# Patient Record
Sex: FEMALE | Race: BLACK OR AFRICAN AMERICAN | NOT HISPANIC OR LATINO | Employment: STUDENT | URBAN - METROPOLITAN AREA
[De-identification: names, ages, dates, MRNs, and addresses within clinical notes are randomized per-mention and may not be internally consistent; named-entity substitution may affect disease eponyms.]

---

## 2020-09-22 ENCOUNTER — LAB VISIT (OUTPATIENT)
Dept: PRIMARY CARE CLINIC | Facility: CLINIC | Age: 20
End: 2020-09-22
Payer: COMMERCIAL

## 2020-09-22 DIAGNOSIS — Z03.818 ENCOUNTER FOR OBSERVATION FOR SUSPECTED EXPOSURE TO OTHER BIOLOGICAL AGENTS RULED OUT: ICD-10-CM

## 2020-09-22 PROCEDURE — U0003 INFECTIOUS AGENT DETECTION BY NUCLEIC ACID (DNA OR RNA); SEVERE ACUTE RESPIRATORY SYNDROME CORONAVIRUS 2 (SARS-COV-2) (CORONAVIRUS DISEASE [COVID-19]), AMPLIFIED PROBE TECHNIQUE, MAKING USE OF HIGH THROUGHPUT TECHNOLOGIES AS DESCRIBED BY CMS-2020-01-R: HCPCS

## 2020-09-24 LAB — SARS-COV-2 RNA RESP QL NAA+PROBE: NOT DETECTED

## 2020-10-02 NOTE — PROGRESS NOTES
CC: Irregular Menstruation     20 y.o. Female presents today for initial evaluation of her irregular menstruation. Patient states she has never utilized birth control. Patient reports stress levels are moderate. Patient states she feels like she is not eating enough in general (unrelated to energy expenditure). Patient states that when she does experience menstruation, periods are normal (not light or heavy) and last for 3-4 days every 35-45 days for many years but cycles have been irregular the last 10 months. Patient feels that her weight if fluctuating as well as her appetite. Patient is not sexually active. Patient has never had a fracture.     Menarche: 12yrs old  LMP:  9/15/2020  Attempted treatments: None      REVIEW OF SYSTEMS:   Constitution: Patient denies fever, chills or fatigue.  Eyes: Patient denies eye pain or vision changes.  CVS: Patient denies chest pain.  Lungs: Patient denies shortness of breath or cough.  Abdomen: Patient denies any stomach pain, nausea, vomiting, or diarrhea  Skin: Patient denies skin rash or itching.    Musculoskeletal: Patient denies recent injuries or trauma.  Neuro: Patient denies any numbness or tingling in upper or lower extremities.  Psych: Patient denies any current anxiety or nervousness.    PAST MEDICAL HISTORY:   History reviewed. No pertinent past medical history.    PAST SURGICAL HISTORY:  History reviewed. No pertinent surgical history.    FAMILY HISTORY:  History reviewed. No pertinent family history.    SOCIAL HISTORY:  Social History     Socioeconomic History    Marital status: Single     Spouse name: Not on file    Number of children: Not on file    Years of education: Not on file    Highest education level: Not on file   Occupational History    Not on file   Social Needs    Financial resource strain: Not on file    Food insecurity     Worry: Not on file     Inability: Not on file    Transportation needs     Medical: Not on file     Non-medical: Not on  "file   Tobacco Use    Smoking status: Not on file   Substance and Sexual Activity    Alcohol use: Not on file    Drug use: Not on file    Sexual activity: Not on file   Lifestyle    Physical activity     Days per week: Not on file     Minutes per session: Not on file    Stress: Not on file   Relationships    Social connections     Talks on phone: Not on file     Gets together: Not on file     Attends Buddhist service: Not on file     Active member of club or organization: Not on file     Attends meetings of clubs or organizations: Not on file     Relationship status: Not on file   Other Topics Concern    Not on file   Social History Narrative    Not on file       MEDICATIONS:   No current outpatient medications on file.    ALLERGIES:   Review of patient's allergies indicates:  No Known Allergies     PHYSICAL EXAMINATION:  /72   Ht 5' 10" (1.778 m)   Wt 62 kg (136 lb 11 oz)   BMI 19.61 kg/m²   Vitals signs and nursing note have been reviewed.    General: In no acute distress, well developed, well nourished, no diaphoresis  Eyes: EOM full and smooth, no eye redness or discharge  HENT: normocephalic and atraumatic, neck supple, trachea midline, no nasal discharge, oral pharynx pink and moist  Cardiovascular: no LE edema, pulse strong and regular, unable to assess capillary refill as patient had an acrylic nails.  Lungs: respirations non-labored, no conversational dyspnea   Abdomen:  Soft, non-distended, no hepatosplenomegaly  Neuro: AAOx3, CN2-12 grossly intact  Skin: No rashes, warm and dry  Psychiatric: cooperative, pleasant, mood and affect appropriate for age      ASSESSMENT:      ICD-10-CM ICD-9-CM   1. Irregular menstrual cycle  N92.6 626.4   2. Amenorrhea  N91.2 626.0         PLAN:  Patient presents today with her regular menstrual cycles for the last 2 months.  She states that her menstrual cycles have been coming every 2-3 months.  She is not sexually active this pregnancy is not a concern " at this moment.  Because we were unable to accurately weight the patient today, we have advised patient to do weekly weight ins with the  at her institution.  Based on the way she provided today, which is an estimate, her BMI is in the low to normal range, but given that her weight has fluctuated there is still concern for low BMI/energy deficiency.  We will put in a referral for a nutritionist as our working diagnosis at this moment is energy deficiency.  We will order thyroid panels to rule out thyroid as a cause of her irregular menstrual cycles.  Will follow-up with patient in 6 weeks.      Time was spent counseled patient on eating healthy and regular diet, weight energy deficiency is, as well as while we are ordering thyroid function test.  Patient was also counseled on the importance of her weekly weigh-in for the next 6 weeks.    Future planning includes - TSH, free T4, nutritionist referral, the weight checks with  as a very heavy.    All questions were answered to the best of my ability and all concerns were addressed at this time.    Follow up in 6 weeks for above, or sooner if needed.      This note is dictated using the M*Modal Fluency Direct word recognition program. There are word recognition mistakes that are occasionally missed on review.

## 2020-10-05 ENCOUNTER — OFFICE VISIT (OUTPATIENT)
Dept: ORTHOPEDICS | Facility: CLINIC | Age: 20
End: 2020-10-05
Payer: COMMERCIAL

## 2020-10-05 VITALS
SYSTOLIC BLOOD PRESSURE: 110 MMHG | DIASTOLIC BLOOD PRESSURE: 72 MMHG | BODY MASS INDEX: 19.57 KG/M2 | WEIGHT: 136.69 LBS | HEIGHT: 70 IN

## 2020-10-05 DIAGNOSIS — N92.6 IRREGULAR MENSTRUAL CYCLE: Primary | ICD-10-CM

## 2020-10-05 DIAGNOSIS — N91.2 AMENORRHEA: ICD-10-CM

## 2020-10-05 PROCEDURE — 99203 PR OFFICE/OUTPT VISIT, NEW, LEVL III, 30-44 MIN: ICD-10-PCS | Mod: S$GLB,,, | Performed by: STUDENT IN AN ORGANIZED HEALTH CARE EDUCATION/TRAINING PROGRAM

## 2020-10-05 PROCEDURE — 3008F PR BODY MASS INDEX (BMI) DOCUMENTED: ICD-10-PCS | Mod: CPTII,S$GLB,, | Performed by: STUDENT IN AN ORGANIZED HEALTH CARE EDUCATION/TRAINING PROGRAM

## 2020-10-05 PROCEDURE — 99999 PR PBB SHADOW E&M-EST. PATIENT-LVL III: CPT | Mod: PBBFAC,,, | Performed by: STUDENT IN AN ORGANIZED HEALTH CARE EDUCATION/TRAINING PROGRAM

## 2020-10-05 PROCEDURE — 99999 PR PBB SHADOW E&M-EST. PATIENT-LVL III: ICD-10-PCS | Mod: PBBFAC,,, | Performed by: STUDENT IN AN ORGANIZED HEALTH CARE EDUCATION/TRAINING PROGRAM

## 2020-10-05 PROCEDURE — 99203 OFFICE O/P NEW LOW 30 MIN: CPT | Mod: S$GLB,,, | Performed by: STUDENT IN AN ORGANIZED HEALTH CARE EDUCATION/TRAINING PROGRAM

## 2020-10-05 PROCEDURE — 3008F BODY MASS INDEX DOCD: CPT | Mod: CPTII,S$GLB,, | Performed by: STUDENT IN AN ORGANIZED HEALTH CARE EDUCATION/TRAINING PROGRAM

## 2020-10-14 ENCOUNTER — LAB VISIT (OUTPATIENT)
Dept: LAB | Facility: HOSPITAL | Age: 20
End: 2020-10-14
Attending: STUDENT IN AN ORGANIZED HEALTH CARE EDUCATION/TRAINING PROGRAM
Payer: COMMERCIAL

## 2020-10-14 DIAGNOSIS — N91.2 AMENORRHEA: ICD-10-CM

## 2020-10-14 PROCEDURE — 36415 COLL VENOUS BLD VENIPUNCTURE: CPT | Mod: PO

## 2020-10-14 PROCEDURE — 84439 ASSAY OF FREE THYROXINE: CPT

## 2020-10-14 PROCEDURE — 84443 ASSAY THYROID STIM HORMONE: CPT

## 2020-10-15 LAB
T4 FREE SERPL-MCNC: 0.98 NG/DL (ref 0.71–1.51)
TSH SERPL DL<=0.005 MIU/L-ACNC: 0.36 UIU/ML (ref 0.4–4)

## 2020-11-11 NOTE — PROGRESS NOTES
CC: irregular periods     20 y.o. Female presents today for follow up evaluation of her irregular periods.  Patient reports she experienced menstruation in September and October with average cycle time between. Patient has yet to see nutritionist, but did report to her labwork appt. Patient reports stress levels are high right now due to both school and athletics. Patient states that overall, she feels more normal since last visit with regards to her periods.     REVIEW OF SYSTEMS:   Constitution: Patient denies fever or chills.  Eyes: Patient denies eye pain or vision changes.  CVS: Patient denies chest pain.  Lungs: Patient denies shortness of breath or cough.  Abdomen: Patient denies any stomach pain, nausea, vomiting, or diarrhea  Skin: Patient denies skin rash or itching.    Musculoskeletal: Patient denies recent injuries or trauma.  Neuro: Patient denies any numbness or tingling in upper or lower extremities.  Psych: Patient denies any current anxiety or nervousness.    PAST MEDICAL HISTORY:   History reviewed. No pertinent past medical history.    PAST SURGICAL HISTORY:  History reviewed. No pertinent surgical history.    FAMILY HISTORY:  History reviewed. No pertinent family history.    SOCIAL HISTORY:  Social History     Socioeconomic History    Marital status: Single     Spouse name: Not on file    Number of children: Not on file    Years of education: Not on file    Highest education level: Not on file   Occupational History    Not on file   Social Needs    Financial resource strain: Not on file    Food insecurity     Worry: Not on file     Inability: Not on file    Transportation needs     Medical: Not on file     Non-medical: Not on file   Tobacco Use    Smoking status: Not on file   Substance and Sexual Activity    Alcohol use: Not on file    Drug use: Not on file    Sexual activity: Not on file   Lifestyle    Physical activity     Days per week: Not on file     Minutes per session: Not on  "file    Stress: Not on file   Relationships    Social connections     Talks on phone: Not on file     Gets together: Not on file     Attends Advent service: Not on file     Active member of club or organization: Not on file     Attends meetings of clubs or organizations: Not on file     Relationship status: Not on file   Other Topics Concern    Not on file   Social History Narrative    Not on file       MEDICATIONS:   No current outpatient medications on file.    ALLERGIES:   Review of patient's allergies indicates:  No Known Allergies     PHYSICAL EXAMINATION:  BP 90/62   Ht 5' 10" (1.778 m)   Wt 62.2 kg (137 lb 2 oz)   BMI 19.68 kg/m²   Vitals signs and nursing note have been reviewed.  General: In no acute distress, well developed, well nourished, no diaphoresis  Eyes: EOM full and smooth, no eye redness or discharge  HENT: normocephalic and atraumatic, neck supple, trachea midline, no nasal discharge  Cardiovascular: no LE edema  Lungs: respirations non-labored, no conversational dyspnea   Neuro: AAOx3, CN2-12 grossly intact  Skin: No rashes, warm and dry  Psychiatric: cooperative, pleasant, mood and affect appropriate for age      ASSESSMENT:      ICD-10-CM ICD-9-CM   1. Irregular menstrual cycle  N92.6 626.4         PLAN:  Was unable to follow up with the nutritionist since her last visit due to scheduling areas.  Patient states that she was doing daily way ends in about 2 weeks ago had actually lost 5 lb but has since gained back.  Patient was instructed to keep a food diary over to expand while she is at home and [place] for the break.  We will schedule for patient to follow-up with nutrition when her schedule permits when she returns in January.  I will follow up with her after the nutritionist visit.  Working diagnosis is still energy deficiency which I think can be remedied to her diet.  Patient has had normal interval menstrual cycles for 2 months now, and I anticipate she will get her cycle this " month.    T4 within normal limits, TSH slightly below normal limits, not too concerned at this time for thyroid abnormality causing menstrual irregularities.    Future planning includes - follow-up with pediatrician, weekly weight checks, 2 weeks flu diary.    All questions were answered to the best of my ability and all concerns were addressed at this time.    Follow up for above, or sooner if needed.      This note is dictated using the M*Modal Fluency Direct word recognition program. There are word recognition mistakes that are occasionally missed on review.

## 2020-11-16 ENCOUNTER — OFFICE VISIT (OUTPATIENT)
Dept: ORTHOPEDICS | Facility: CLINIC | Age: 20
End: 2020-11-16
Payer: COMMERCIAL

## 2020-11-16 VITALS
SYSTOLIC BLOOD PRESSURE: 90 MMHG | WEIGHT: 137.13 LBS | BODY MASS INDEX: 19.63 KG/M2 | DIASTOLIC BLOOD PRESSURE: 62 MMHG | HEIGHT: 70 IN

## 2020-11-16 DIAGNOSIS — N92.6 IRREGULAR MENSTRUAL CYCLE: Primary | ICD-10-CM

## 2020-11-16 PROCEDURE — 3008F BODY MASS INDEX DOCD: CPT | Mod: CPTII,S$GLB,, | Performed by: STUDENT IN AN ORGANIZED HEALTH CARE EDUCATION/TRAINING PROGRAM

## 2020-11-16 PROCEDURE — 99213 OFFICE O/P EST LOW 20 MIN: CPT | Mod: S$GLB,,, | Performed by: STUDENT IN AN ORGANIZED HEALTH CARE EDUCATION/TRAINING PROGRAM

## 2020-11-16 PROCEDURE — 3008F PR BODY MASS INDEX (BMI) DOCUMENTED: ICD-10-PCS | Mod: CPTII,S$GLB,, | Performed by: STUDENT IN AN ORGANIZED HEALTH CARE EDUCATION/TRAINING PROGRAM

## 2020-11-16 PROCEDURE — 99999 PR PBB SHADOW E&M-EST. PATIENT-LVL II: CPT | Mod: PBBFAC,,, | Performed by: STUDENT IN AN ORGANIZED HEALTH CARE EDUCATION/TRAINING PROGRAM

## 2020-11-16 PROCEDURE — 99213 PR OFFICE/OUTPT VISIT, EST, LEVL III, 20-29 MIN: ICD-10-PCS | Mod: S$GLB,,, | Performed by: STUDENT IN AN ORGANIZED HEALTH CARE EDUCATION/TRAINING PROGRAM

## 2020-11-16 PROCEDURE — 99999 PR PBB SHADOW E&M-EST. PATIENT-LVL II: ICD-10-PCS | Mod: PBBFAC,,, | Performed by: STUDENT IN AN ORGANIZED HEALTH CARE EDUCATION/TRAINING PROGRAM

## 2021-08-24 ENCOUNTER — ATHLETIC TRAINING SESSION (OUTPATIENT)
Dept: SPORTS MEDICINE | Facility: CLINIC | Age: 21
End: 2021-08-24

## 2021-08-24 NOTE — PROGRESS NOTES
Body Part Hip/Low Back   Injury Date 8/23/21       History: Pt reports intense (8/10) low back pain insidious onset. Pt states the pain is intermittent and last about 30 seconds when it happens. Pain starts with specific movements (walk/run/bend over) and limits her motion. Pain started yesterday and became more intense over time. No numbness or tingling. Pain is localized to specific spot (SI junction right side). Pt was able to finish practice without any limitation.     Physical Evaluation: No swelling or discoloration noted. Pt's gait is normal but becomes antalgic when pain begins. Pt is able to deep squat normally w/o pain. Lumbar motion WNL, hip motion BL WNL. MMT WNL BL for hip and spine. No pain to palpation anywhere on pelvis, sacrum, lumbar spine, or femur. Jt mobility normal at lumbar spine and hip.      Treatment: Attempted maneuvers to adjust SI joint (distraction, compression, active adduction/abduction, alternate active hip flexion/extension both directions) pt felt discomfort during all maneuvers and reported feeling minimally better when she stood up. Pt shown stretches to do for piriformis tightness (figure 4 standing and on table)     Plan: Pt will follow-up before practice tomorrow. If pain persists, may attempt SI maneuvers a second time or refer to OMT.     Suspected Injury: Sacro-iliac joint dysfunction R side.      Update 8/25/21: Saw pt at practice, asked how hip/back was feeling, pt reported feeling 100% better with no pain at all. Pt will follow-up if pain returns.

## 2021-09-29 ENCOUNTER — ATHLETIC TRAINING SESSION (OUTPATIENT)
Dept: SPORTS MEDICINE | Facility: CLINIC | Age: 21
End: 2021-09-29

## 2021-09-30 ENCOUNTER — ATHLETIC TRAINING SESSION (OUTPATIENT)
Dept: SPORTS MEDICINE | Facility: CLINIC | Age: 21
End: 2021-09-30

## 2021-10-01 ENCOUNTER — ATHLETIC TRAINING SESSION (OUTPATIENT)
Dept: SPORTS MEDICINE | Facility: CLINIC | Age: 21
End: 2021-10-01

## 2021-10-06 ENCOUNTER — ATHLETIC TRAINING SESSION (OUTPATIENT)
Dept: SPORTS MEDICINE | Facility: CLINIC | Age: 21
End: 2021-10-06

## 2021-10-09 ENCOUNTER — ATHLETIC TRAINING SESSION (OUTPATIENT)
Dept: SPORTS MEDICINE | Facility: CLINIC | Age: 21
End: 2021-10-09

## 2021-10-12 ENCOUNTER — ATHLETIC TRAINING SESSION (OUTPATIENT)
Dept: SPORTS MEDICINE | Facility: CLINIC | Age: 21
End: 2021-10-12

## 2022-01-21 NOTE — PROGRESS NOTES
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 Pt reports she had headache and cough. Pt reports no prev hx of cardiac / pulmonary conditions. Pt reports no family history of cardiac / pulmonary conditions or sudden cardiac arrest under 49 y/o. Pt reports they have not participated in physical activity since testing positive for COVID-19, and reports no abnormal symptoms with ADLs.  Date of positive test: 12/08/2021  Time in isolation: 7 days  Symptoms during viral course: headache, cough  Fever and/or chills: No  Body aches and/or Fatigue:  No  Chest pain: No  Hospitalization required?: No  H/o COVID: No    Vaccinated: Yes  Booster:No     REVIEW OF SYSTEMS:   Constitution: Patient denies fever or chills.  Eyes: Patient denies eye pain or vision changes.  CVS: Patient denies chest pain.  Lungs: Patient denies shortness of breath or cough.  Abdomen: Patient denies any stomach pain, nausea, vomiting, or diarrhea  Skin: Patient denies skin rash or itching.    Musculoskeletal: Patient denies recent injuries or trauma.  Neuro: Patient denies any numbness or tingling in upper or lower extremities.  Psych: Patient denies any current anxiety or nervousness.     PAST MEDICAL HISTORY:   No past medical history on file.    PAST SURGICAL HISTORY:  No past surgical history on file.     FAMILY HISTORY:  No family history on file.    SOCIAL HISTORY:  Social Connections: Not on file       MEDICATIONS:   No current outpatient medications on file.    ALLERGIES:   Review of patient's allergies indicates:  No Known Allergies     PHYSICAL EXAMINATION:  Vitals:    01/24/22 1538   BP: 110/64     Vitals signs and nursing note have been reviewed.  General: In no acute distress, well developed, well nourished, no diaphoresis  Eyes: EOM full and smooth, no eye redness or discharge  HEENT:  normocephalic and atraumatic, neck supple, trachea midline, no nasal discharge  Cardiovascular:  Regular rate and rhythm, S1 and S2 audible, no S3, no S4, no murmurs, no thrills, no JVD, no LE edema, bilateral and symmetric 2+ DP and radial pulses bilaterally  Lungs: respirations non-labored, no conversational dyspnea, CTABL, no wheezing, no rhonchi  Neuro: AAOx3, CN2-12 grossly intact  Skin: No rashes, warm and dry  Psychiatric: cooperative, pleasant, mood and affect appropriate for age     CARDIAC TESTING: none warranted at this time       ASSESSMENT:    1. History of 2019 novel coronavirus disease (COVID-19)    2. Encounter for observation for suspected exposure to other biological agents ruled out    3. Personal history of unspecified infectious and parasitic disease           PLAN:  1-3. History of COVID-19 -      -  Patient previously tested positive for COVID-19 on 12/8/21 and is here to obtain cardiac clearance prior to engaging in athletic activity due to possibility of myocarditis.  During the course of her COVID-19 infection, patient experienced mild symptoms. Patient denies known family history of cardiac conditions or early death.      - At this time the patient is completely asymptomatic and there are no exam findings concerning for myocarditis. In my opinion it is safe for the patient to start progressing weight lifting and cardiovascular training slowly and under the supervision of the athletic training staff.     - [facility] has been informed and is on board with the plan.     - Future planning includes -  If symptoms exacerbate during return to activity, further cardiac testing will be warranted.     All questions were answered to the best of my ability and all concerns were addressed at this time.     Follow up as needed for above. I will remain in close contact with the  to insure no symptoms occur when returning to exercise.        This note is dictated using the M*Modal Fluency Direct  word recognition program. There are word recognition mistakes that are occasionally missed on review.

## 2022-01-24 ENCOUNTER — OFFICE VISIT (OUTPATIENT)
Dept: SPORTS MEDICINE | Facility: CLINIC | Age: 22
End: 2022-01-24
Payer: COMMERCIAL

## 2022-01-24 VITALS
BODY MASS INDEX: 19.63 KG/M2 | SYSTOLIC BLOOD PRESSURE: 110 MMHG | HEIGHT: 70 IN | DIASTOLIC BLOOD PRESSURE: 64 MMHG | WEIGHT: 137.13 LBS

## 2022-01-24 DIAGNOSIS — Z86.19 PERSONAL HISTORY OF UNSPECIFIED INFECTIOUS AND PARASITIC DISEASE: ICD-10-CM

## 2022-01-24 DIAGNOSIS — Z86.16 HISTORY OF 2019 NOVEL CORONAVIRUS DISEASE (COVID-19): Primary | ICD-10-CM

## 2022-01-24 DIAGNOSIS — Z03.818 ENCOUNTER FOR OBSERVATION FOR SUSPECTED EXPOSURE TO OTHER BIOLOGICAL AGENTS RULED OUT: ICD-10-CM

## 2022-01-24 PROCEDURE — 99999 PR PBB SHADOW E&M-EST. PATIENT-LVL II: CPT | Mod: PBBFAC,,, | Performed by: STUDENT IN AN ORGANIZED HEALTH CARE EDUCATION/TRAINING PROGRAM

## 2022-01-24 PROCEDURE — 3078F PR MOST RECENT DIASTOLIC BLOOD PRESSURE < 80 MM HG: ICD-10-PCS | Mod: CPTII,S$GLB,, | Performed by: STUDENT IN AN ORGANIZED HEALTH CARE EDUCATION/TRAINING PROGRAM

## 2022-01-24 PROCEDURE — 3074F PR MOST RECENT SYSTOLIC BLOOD PRESSURE < 130 MM HG: ICD-10-PCS | Mod: CPTII,S$GLB,, | Performed by: STUDENT IN AN ORGANIZED HEALTH CARE EDUCATION/TRAINING PROGRAM

## 2022-01-24 PROCEDURE — 1160F PR REVIEW ALL MEDS BY PRESCRIBER/CLIN PHARMACIST DOCUMENTED: ICD-10-PCS | Mod: CPTII,S$GLB,, | Performed by: STUDENT IN AN ORGANIZED HEALTH CARE EDUCATION/TRAINING PROGRAM

## 2022-01-24 PROCEDURE — 3008F PR BODY MASS INDEX (BMI) DOCUMENTED: ICD-10-PCS | Mod: CPTII,S$GLB,, | Performed by: STUDENT IN AN ORGANIZED HEALTH CARE EDUCATION/TRAINING PROGRAM

## 2022-01-24 PROCEDURE — 99214 OFFICE O/P EST MOD 30 MIN: CPT | Mod: S$GLB,,, | Performed by: STUDENT IN AN ORGANIZED HEALTH CARE EDUCATION/TRAINING PROGRAM

## 2022-01-24 PROCEDURE — 3008F BODY MASS INDEX DOCD: CPT | Mod: CPTII,S$GLB,, | Performed by: STUDENT IN AN ORGANIZED HEALTH CARE EDUCATION/TRAINING PROGRAM

## 2022-01-24 PROCEDURE — 1160F RVW MEDS BY RX/DR IN RCRD: CPT | Mod: CPTII,S$GLB,, | Performed by: STUDENT IN AN ORGANIZED HEALTH CARE EDUCATION/TRAINING PROGRAM

## 2022-01-24 PROCEDURE — 1159F PR MEDICATION LIST DOCUMENTED IN MEDICAL RECORD: ICD-10-PCS | Mod: CPTII,S$GLB,, | Performed by: STUDENT IN AN ORGANIZED HEALTH CARE EDUCATION/TRAINING PROGRAM

## 2022-01-24 PROCEDURE — 1159F MED LIST DOCD IN RCRD: CPT | Mod: CPTII,S$GLB,, | Performed by: STUDENT IN AN ORGANIZED HEALTH CARE EDUCATION/TRAINING PROGRAM

## 2022-01-24 PROCEDURE — 3074F SYST BP LT 130 MM HG: CPT | Mod: CPTII,S$GLB,, | Performed by: STUDENT IN AN ORGANIZED HEALTH CARE EDUCATION/TRAINING PROGRAM

## 2022-01-24 PROCEDURE — 99999 PR PBB SHADOW E&M-EST. PATIENT-LVL II: ICD-10-PCS | Mod: PBBFAC,,, | Performed by: STUDENT IN AN ORGANIZED HEALTH CARE EDUCATION/TRAINING PROGRAM

## 2022-01-24 PROCEDURE — 99214 PR OFFICE/OUTPT VISIT, EST, LEVL IV, 30-39 MIN: ICD-10-PCS | Mod: S$GLB,,, | Performed by: STUDENT IN AN ORGANIZED HEALTH CARE EDUCATION/TRAINING PROGRAM

## 2022-01-24 PROCEDURE — 3078F DIAST BP <80 MM HG: CPT | Mod: CPTII,S$GLB,, | Performed by: STUDENT IN AN ORGANIZED HEALTH CARE EDUCATION/TRAINING PROGRAM
